# Patient Record
Sex: FEMALE | Race: WHITE | Employment: FULL TIME | URBAN - METROPOLITAN AREA
[De-identification: names, ages, dates, MRNs, and addresses within clinical notes are randomized per-mention and may not be internally consistent; named-entity substitution may affect disease eponyms.]

---

## 2019-05-08 ENCOUNTER — HOSPITAL ENCOUNTER (EMERGENCY)
Age: 44
Discharge: HOME | End: 2019-05-08
Payer: COMMERCIAL

## 2019-05-08 VITALS
SYSTOLIC BLOOD PRESSURE: 139 MMHG | DIASTOLIC BLOOD PRESSURE: 83 MMHG | OXYGEN SATURATION: 99 % | WEIGHT: 270 LBS | TEMPERATURE: 98.1 F | HEART RATE: 104 BPM | RESPIRATION RATE: 20 BRPM

## 2019-05-08 DIAGNOSIS — R21 RASH: Primary | ICD-10-CM

## 2019-05-08 PROCEDURE — 99212 OFFICE O/P EST SF 10 MIN: CPT

## 2019-05-08 RX ORDER — METHYLPREDNISOLONE 4 MG/1
TABLET ORAL
Qty: 21 TABLET | Status: SHIPPED | OUTPATIENT
Start: 2019-05-08 | End: 2019-05-14

## 2019-05-08 NOTE — ED PROVIDER NOTES
over the left cheek there is erythema of the left cheek and the left cheek she has one area that appears to be a hive, she has fine rash up towards the left ear and down into the left neck there are no blisters, there is no large raised lesions. Eyes:  PERRL, EOMI, no discharge. Ears:  TMs without perforation, injection, or bulging. External canals clear without exudate. Mouth:  Mucous membranes moist without lesions, tongue and gums normal.  Throat:  Pharynx without injection, exudate, or tonsillar hypertrophy. Airway patient. Neck:  Supple. No lymphadenopathy. Respiratory:  Clear to auscultation and breath sounds equal.  CV:  Regular rate and rhythm. GI:  Abdomen Soft, nontender, +BS. Integument:  Skin turgor: Normal.              Swelling of the left cheek both cheeks are erythematous she said that's normal for her. She does have one hive on the left cheek it appears to have just some raised papular fine lesions from the cheek up to the left ear and some down the left side of the neck. .  Neurological:  Orientation age-appropriate unless noted elseware. Motor functions intact. Lab / Imaging Results   (All laboratory and radiology results have been personally reviewed by myself)  Labs:  No results found for this visit on 05/08/19. Imaging: All Radiology results interpreted by Radiologist unless otherwise noted. No orders to display       ED Course / Medical Decision Making   Medications - No data to display         Procedures:       MDM:   Possibilities for this rash is a contact dermatitis, also I thought of shingles however this does not look blistery and there is ane area on the cheek that looks like a hive. Does not appear to be cellulitis because the redness is there all the time she said on her cheeks and that's unchanged. Benadryl helped this last night I advised her to continue to take that as needed we'll put her on a Medrol Dosepak.   If anything changes or worsens with this she should get reevaluated    Counseling:    I have  spoken with the patient and discussed todays results, in addition to providing specific details for the plan of care and counseling regarding the diagnosis and prognosis. Questions are answered at this time and they are agreeable with the plan. Assessment      1. Rash      Plan   Discharge to home and advised to contact follow up with your Dr         Patient condition is good    New Medications     New Prescriptions    METHYLPREDNISOLONE (MEDROL, ALEC,) 4 MG TABLET    Take as directed on package insert days 1-6     Electronically signed by JAMIE Ocampo CNP   DD: 5/8/19  **This report was transcribed using voice recognition software. Every effort was made to ensure accuracy; however, inadvertent computerized transcription errors may be present.   END OF ED PROVIDER NOTE       JAMIE Ocampo CNP  05/08/19 1962

## 2019-06-24 ENCOUNTER — HOSPITAL ENCOUNTER (EMERGENCY)
Age: 44
Discharge: HOME | End: 2019-06-24
Payer: COMMERCIAL

## 2019-06-24 VITALS
TEMPERATURE: 98.1 F | OXYGEN SATURATION: 99 % | DIASTOLIC BLOOD PRESSURE: 87 MMHG | SYSTOLIC BLOOD PRESSURE: 137 MMHG | HEART RATE: 83 BPM | RESPIRATION RATE: 16 BRPM | WEIGHT: 262 LBS

## 2019-06-24 DIAGNOSIS — H00.011 HORDEOLUM EXTERNUM OF RIGHT UPPER EYELID: Primary | ICD-10-CM

## 2019-06-24 DIAGNOSIS — B37.2 YEAST INFECTION OF THE SKIN: ICD-10-CM

## 2019-06-24 PROCEDURE — 99212 OFFICE O/P EST SF 10 MIN: CPT

## 2019-06-24 RX ORDER — AMOXICILLIN AND CLAVULANATE POTASSIUM 875; 125 MG/1; MG/1
1 TABLET, FILM COATED ORAL 2 TIMES DAILY
Qty: 14 TABLET | Refills: 0 | Status: SHIPPED | OUTPATIENT
Start: 2019-06-24 | End: 2019-07-01

## 2019-06-24 RX ORDER — ERYTHROMYCIN 5 MG/G
OINTMENT OPHTHALMIC
Qty: 1 TUBE | Refills: 0 | Status: SHIPPED | OUTPATIENT
Start: 2019-06-24 | End: 2019-07-04

## 2019-06-24 RX ORDER — CLOTRIMAZOLE 1 %
CREAM (GRAM) TOPICAL
Qty: 30 G | Refills: 1 | Status: SHIPPED | OUTPATIENT
Start: 2019-06-24 | End: 2019-07-01

## 2019-06-24 RX ORDER — METHYLPREDNISOLONE 4 MG/1
TABLET ORAL
Qty: 21 TABLET | Status: SHIPPED | OUTPATIENT
Start: 2019-06-24 | End: 2019-06-30

## 2019-06-24 NOTE — ED PROVIDER NOTES
Department of Emergency Medicine  [address] [place] [address]  Provider Note  Admit Date/Time: 6/24/2019  9:49 AM  Room: 03/03  MRN: [id number]  Chief Complaint: Eye Problem (pt right eye lid is red and swollen started yesterday morning. eye is watery)       History of Present Illness   Source of history provided by:  patient. History/Exam Limitations: none. Cheikh Turner is a 40 y.o. female who has a past medical history of: History reviewed. No pertinent past medical history. presents to the urgent care center by private car for relation of redness and swelling to her eyelid. She said she woke up like that this morning. She said she noticed 2 days ago she had a tiny bump on her eyelid and did not think much of it. She said this morning the area is draining and also its more red and swollen on the eyelid. There is no involvement of the eye itself and she said this is not painful. She does not have a fever. Also has a red rash under both breasts that she has had for a while. She said over-the-counter creams help with temporarily but then it comes back. ROS    Pertinent positives and negatives are stated within HPI, all other systems reviewed and are negative. History reviewed. No pertinent surgical history. Social History:  reports that she has never smoked. She has never used smokeless tobacco. She reports that she does not drink alcohol or use drugs. Family History: family history is not on file. Allergies: Patient has no known allergies. Physical Exam   Oxygen Saturation Interpretation: Normal.   ED Triage Vitals [06/24/19 0951]   BP Temp Temp Source Pulse Resp SpO2 Height Weight   137/87 98.1 °F (36.7 °C) Oral 83 16 99 % -- 262 lb (118.8 kg)       Physical Exam  · Constitutional/General: Alert and oriented x3, well appearing, non toxic in NAD  · HEENT:  NC/NT. PERRLA,  Airway patent. Bilateral conjunctiva are clear. Right eyelid is red and swollen.   Eye is totally swollen shut because of the edema, there is no periorbital tenderness or erythema. · Neck: Supple, full ROM,   · Respiratory:  Not in respiratory distress  · CV:  Regular rate. · GI:  Abdomen Soft,   · Musculoskeletal: Moves all extremities x 4.   · Integument: skin warm and dry. She has redness under both breasts consist infection. · Lymphatic: no lymphadenopathy noted  · Neurologic: GCS 15, no focal deficits, symmetric strength 5/5 in the upper and lower extremities bilaterally  · Psychiatric: Normal Affect    Lab / Imaging Results   (All laboratory and radiology results have been personally reviewed by myself)  Labs:  No results found for this visit on 06/24/19. Imaging: All Radiology results interpreted by Radiologist unless otherwise noted. No orders to display       ED Course / Medical Decision Making   Medications - No data to display       Consult(s):   None    Procedure(s):   none    MDM:   States she did notice she had a small bump on her eyelid 2 days ago. Most likely this is a stye it is just become really inflamed. I did put her on erythromycin ointment to use 4 times daily and als since the redness and inflammation is extensive some Augmentin. She also has quite a bit of swelling in the eye is totally swollen shut I did put her on a Medrol Dosepak. Also for the Candida infection under the breasts I did order Lotrimin cream.  She should follow-up with her doctor if the eye worsens or she develops any eye pain or worsening symptoms she should go to the ED for evaluation    Counseling:    I have  spoken with the patient and discussed todays results, in addition to providing specific details for the plan of care and counseling regarding the diagnosis and prognosis. Questions are answered at this time and they are agreeable with the plan. Assessment      1. Hordeolum externum of right upper eyelid    2.  Yeast infection of the skin      Plan   Discharge to home and advised to contact No follow-up provider specified. Patient condition is good    New Medications     New Prescriptions    AMOXICILLIN-CLAVULANATE (AUGMENTIN) 875-125 MG PER TABLET    Take 1 tablet by mouth 2 times daily for 7 days    CLOTRIMAZOLE (LOTRIMIN) 1 % CREAM    Apply topically 2 times daily. ERYTHROMYCIN (ROMYCIN) 5 MG/GM OPHTHALMIC OINTMENT    Apply thin layer oint to area on upper eyelid QID. METHYLPREDNISOLONE (MEDROL, ALEC,) 4 MG TABLET    Take as directed on package insert days 1-6     Electronically signed by JAMIE Juarez CNP   DD: 6/24/19  **This report was transcribed using voice recognition software. Every effort was made to ensure accuracy; however, inadvertent computerized transcription errors may be present.   END OF ED PROVIDER NOTE     JAMIE Juarez CNP  06/24/19 1012

## 2020-12-11 ENCOUNTER — HOSPITAL ENCOUNTER (EMERGENCY)
Age: 45
Discharge: HOME | End: 2020-12-11
Payer: COMMERCIAL

## 2020-12-11 VITALS
WEIGHT: 260 LBS | BODY MASS INDEX: 40.81 KG/M2 | SYSTOLIC BLOOD PRESSURE: 126 MMHG | TEMPERATURE: 97.7 F | HEIGHT: 67 IN | RESPIRATION RATE: 18 BRPM | OXYGEN SATURATION: 100 % | HEART RATE: 79 BPM | DIASTOLIC BLOOD PRESSURE: 84 MMHG

## 2020-12-11 PROCEDURE — 99212 OFFICE O/P EST SF 10 MIN: CPT

## 2020-12-11 NOTE — ED PROVIDER NOTES
[facility]  Department of Emergency Medicine   ED  Encounter Note  Admit Date/RoomTime: 2020  9:20 AM  ED Room:     NAME: Isabelle Zhong  : 1975  MRN: [id number]     Chief Complaint:  Ear Problem (AS/ Pt states \"top of the Q~tip is stuck in the ear\" )    History of Present Illness        Isabelle Zhong is a 39 y.o. old female who presenting to the emergency department with complaint of cotton stuck in her left ear. Patient states after cleaning her ears when she pulled the Q-tip out there was not as much cotton on the end of it. She feels like there is cotton still in her left ear canal.    During our discussions patient also brings up a \"whoosh\" sound that she constantly has to her left ear. States when she pushes on the left side of her neck it does go away. We did discuss possible carotid bruits, neck vasculature issue. I did recommend to her that she talk to her primary care physician and discuss a possible ultrasound to have her neck vasculature evaluated. ROS   Pertinent positives and negatives are stated within HPI, all other systems reviewed and are negative. Past Medical History:  has no past medical history on file. Surgical History:  has no past surgical history on file. Social History:  reports that she has never smoked. She has never used smokeless tobacco. She reports that she does not drink alcohol or use drugs. Family History: family history is not on file. She was adopted. Allergies: Patient has no known allergies. Physical Exam   Oxygen Saturation Interpretation: Normal.        ED Triage Vitals [20 0921]   BP Temp Temp Source Pulse Resp SpO2 Height Weight   126/84 97.7 °F (36.5 °C) Temporal 79 18 100 % 5' 7\" (1.702 m) 260 lb (117.9 kg)         General:  NAD. Alert and Oriented. Well-appearing. Skin:  Warm, dry. No rashes. Head:  Normocephalic. Atraumatic. Eyes:  EOMI.   Conjunctiva normal.  ENT:  Oral mucosa moist.  Airway patent. Posterior pharynx without erythema, without swelling, without exudate. Uvula is midline with equal rise. Bilateral TMs without erythema, without bulging. There is no foreign body of the right ear canal or left ear canal.  There is no retained cotton to the left ear canal.  Neck:  Supple. Normal ROM. I do not appreciate any carotid bruits on auscultation. Respiratory:  No respiratory distress. No labored breathing. Lungs clear without rales, rhonchi or wheezing. Cardiovascular:  Regular rate. No Murmur. No peripheral edema. Extremities warm and good color. Extremities:  Normal ROM. Nontender to palpation. Atraumatic. Back:  Normal ROM. Nontender to palpation. Neuro:  Alert and Oriented to person, place, time and situation. Normal LOC. Moves all extremities. Speech fluent. Psych:  Calm and Cooperative. Normal thought process. Normal judgement. Lab / Imaging Results   (All laboratory and radiology results have been personally reviewed by myself)  Labs:  No results found for this visit on 12/11/20. Imaging: All Radiology results interpreted by Radiologist unless otherwise noted. No orders to display     ED Course / Medical Decision Making   Medications - No data to display     Re-examination:  12/11/20       Time:        Consult(s):   None    Procedure(s):   none    MDM:       Plan of Care/Counseling:  I reviewed today's visit with the patient in addition to providing specific details for the plan of care and counseling regarding the diagnosis and prognosis. Questions are answered at this time and are agreeable with the plan. Assessment      1. Otalgia, left ear New Problem     Plan   Discharge to home. Patient condition is good    New Medications     New Prescriptions    No medications on file     Electronically signed by Claudina Osler, PA   DD: 12/11/20  **This report was transcribed using voice recognition software.  Every effort was made to ensure accuracy; however, inadvertent computerized transcription errors may be present.   END OF ED PROVIDER NOTE       Roxanna Jensen [place]  12/11/20 [id number]